# Patient Record
Sex: MALE | Race: WHITE | NOT HISPANIC OR LATINO | Employment: UNEMPLOYED
[De-identification: names, ages, dates, MRNs, and addresses within clinical notes are randomized per-mention and may not be internally consistent; named-entity substitution may affect disease eponyms.]

---

## 2018-06-13 ENCOUNTER — SURGERY (OUTPATIENT)
Age: 28
End: 2018-06-13

## 2018-06-13 ENCOUNTER — HOSPITAL ENCOUNTER (OUTPATIENT)
Facility: HOSPITAL | Age: 28
Discharge: HOME | End: 2018-06-14
Attending: EMERGENCY MEDICINE | Admitting: SURGERY

## 2018-06-13 ENCOUNTER — ANESTHESIA (OUTPATIENT)
Dept: SURGERY | Facility: HOSPITAL | Age: 28
End: 2018-06-13

## 2018-06-13 ENCOUNTER — ANESTHESIA EVENT (OUTPATIENT)
Dept: SURGERY | Facility: HOSPITAL | Age: 28
End: 2018-06-13

## 2018-06-13 DIAGNOSIS — T18.5XXA FOREIGN BODY IN ANUS AND RECTUM, INITIAL ENCOUNTER: ICD-10-CM

## 2018-06-13 DIAGNOSIS — T18.5XXA RECTAL FOREIGN BODY, INITIAL ENCOUNTER: ICD-10-CM

## 2018-06-13 DIAGNOSIS — T18.5XXA FOREIGN BODY ANUS/RECTUM, INITIAL ENCOUNTER: Primary | ICD-10-CM

## 2018-06-13 LAB
ALBUMIN SERPL BCP-MCNC: 4.8 G/DL
ALP SERPL-CCNC: 59 U/L
ALT SERPL W/O P-5'-P-CCNC: 13 U/L
ANION GAP SERPL CALC-SCNC: 13 MMOL/L
AST SERPL-CCNC: 26 U/L
BASOPHILS # BLD AUTO: 0.02 K/UL
BASOPHILS NFR BLD: 0.2 %
BILIRUB SERPL-MCNC: 0.6 MG/DL
BUN SERPL-MCNC: 9 MG/DL
CALCIUM SERPL-MCNC: 9.6 MG/DL
CHLORIDE SERPL-SCNC: 103 MMOL/L
CO2 SERPL-SCNC: 24 MMOL/L
CREAT SERPL-MCNC: 1.1 MG/DL
DIFFERENTIAL METHOD: ABNORMAL
EOSINOPHIL # BLD AUTO: 0 K/UL
EOSINOPHIL NFR BLD: 0 %
ERYTHROCYTE [DISTWIDTH] IN BLOOD BY AUTOMATED COUNT: 12.1 %
EST. GFR  (AFRICAN AMERICAN): >60 ML/MIN/1.73 M^2
EST. GFR  (NON AFRICAN AMERICAN): >60 ML/MIN/1.73 M^2
GLUCOSE SERPL-MCNC: 107 MG/DL
HCT VFR BLD AUTO: 43.7 %
HGB BLD-MCNC: 15.4 G/DL
LYMPHOCYTES # BLD AUTO: 1.4 K/UL
LYMPHOCYTES NFR BLD: 13.3 %
MCH RBC QN AUTO: 32 PG
MCHC RBC AUTO-ENTMCNC: 35.2 G/DL
MCV RBC AUTO: 91 FL
MONOCYTES # BLD AUTO: 0.6 K/UL
MONOCYTES NFR BLD: 5.4 %
NEUTROPHILS # BLD AUTO: 8.3 K/UL
NEUTROPHILS NFR BLD: 81.1 %
PLATELET # BLD AUTO: 203 K/UL
PMV BLD AUTO: 11.7 FL
POTASSIUM SERPL-SCNC: 4.5 MMOL/L
PROT SERPL-MCNC: 7.9 G/DL
RBC # BLD AUTO: 4.82 M/UL
SODIUM SERPL-SCNC: 140 MMOL/L
WBC # BLD AUTO: 10.29 K/UL

## 2018-06-13 PROCEDURE — 36000704 HC OR TIME LEV I 1ST 15 MIN: Performed by: SURGERY

## 2018-06-13 PROCEDURE — 63600175 PHARM REV CODE 636 W HCPCS: Performed by: NURSE ANESTHETIST, CERTIFIED REGISTERED

## 2018-06-13 PROCEDURE — 36415 COLL VENOUS BLD VENIPUNCTURE: CPT

## 2018-06-13 PROCEDURE — 25000003 PHARM REV CODE 250: Performed by: NURSE ANESTHETIST, CERTIFIED REGISTERED

## 2018-06-13 PROCEDURE — 99285 EMERGENCY DEPT VISIT HI MDM: CPT

## 2018-06-13 PROCEDURE — 80053 COMPREHEN METABOLIC PANEL: CPT

## 2018-06-13 PROCEDURE — 85025 COMPLETE CBC W/AUTO DIFF WBC: CPT

## 2018-06-13 PROCEDURE — 99284 EMERGENCY DEPT VISIT MOD MDM: CPT | Mod: 57,,, | Performed by: SURGERY

## 2018-06-13 PROCEDURE — 63600175 PHARM REV CODE 636 W HCPCS: Performed by: ANESTHESIOLOGY

## 2018-06-13 PROCEDURE — 37000009 HC ANESTHESIA EA ADD 15 MINS: Performed by: SURGERY

## 2018-06-13 PROCEDURE — 46608 ANOSCOPY REMOVE FOR BODY: CPT | Mod: ,,, | Performed by: SURGERY

## 2018-06-13 PROCEDURE — 36000705 HC OR TIME LEV I EA ADD 15 MIN: Performed by: SURGERY

## 2018-06-13 PROCEDURE — 71000015 HC POSTOP RECOV 1ST HR: Performed by: SURGERY

## 2018-06-13 PROCEDURE — 37000008 HC ANESTHESIA 1ST 15 MINUTES: Performed by: SURGERY

## 2018-06-13 PROCEDURE — 71000033 HC RECOVERY, INTIAL HOUR: Performed by: SURGERY

## 2018-06-13 RX ORDER — ACETAMINOPHEN 10 MG/ML
1000 INJECTION, SOLUTION INTRAVENOUS ONCE
Status: DISCONTINUED | OUTPATIENT
Start: 2018-06-14 | End: 2018-06-14 | Stop reason: HOSPADM

## 2018-06-13 RX ORDER — SODIUM CHLORIDE 0.9 % (FLUSH) 0.9 %
3 SYRINGE (ML) INJECTION EVERY 8 HOURS
Status: DISCONTINUED | OUTPATIENT
Start: 2018-06-14 | End: 2018-06-14 | Stop reason: HOSPADM

## 2018-06-13 RX ORDER — MEPERIDINE HYDROCHLORIDE 50 MG/ML
12.5 INJECTION INTRAMUSCULAR; INTRAVENOUS; SUBCUTANEOUS ONCE AS NEEDED
Status: DISCONTINUED | OUTPATIENT
Start: 2018-06-14 | End: 2018-06-14 | Stop reason: HOSPADM

## 2018-06-13 RX ORDER — SODIUM CHLORIDE 0.9 % (FLUSH) 0.9 %
3 SYRINGE (ML) INJECTION
Status: DISCONTINUED | OUTPATIENT
Start: 2018-06-14 | End: 2018-06-14 | Stop reason: HOSPADM

## 2018-06-13 RX ORDER — FENTANYL CITRATE 50 UG/ML
25 INJECTION, SOLUTION INTRAMUSCULAR; INTRAVENOUS EVERY 5 MIN PRN
Status: DISCONTINUED | OUTPATIENT
Start: 2018-06-14 | End: 2018-06-14 | Stop reason: HOSPADM

## 2018-06-13 RX ADMIN — PROPOFOL 170 MG: 10 INJECTION, EMULSION INTRAVENOUS at 11:06

## 2018-06-13 RX ADMIN — ROCURONIUM BROMIDE 5 MG: 10 INJECTION, SOLUTION INTRAVENOUS at 11:06

## 2018-06-13 RX ADMIN — SODIUM CHLORIDE, SODIUM LACTATE, POTASSIUM CHLORIDE, AND CALCIUM CHLORIDE: 600; 310; 30; 20 INJECTION, SOLUTION INTRAVENOUS at 11:06

## 2018-06-13 RX ADMIN — ONDANSETRON 4 MG: 2 INJECTION, SOLUTION INTRAMUSCULAR; INTRAVENOUS at 11:06

## 2018-06-13 RX ADMIN — SUCCINYLCHOLINE CHLORIDE 120 MG: 20 INJECTION, SOLUTION INTRAMUSCULAR; INTRAVENOUS at 11:06

## 2018-06-13 RX ADMIN — FENTANYL CITRATE 100 MCG: 50 INJECTION, SOLUTION INTRAMUSCULAR; INTRAVENOUS at 11:06

## 2018-06-13 RX ADMIN — LIDOCAINE HYDROCHLORIDE 60 MG: 20 INJECTION, SOLUTION EPIDURAL; INFILTRATION; INTRACAUDAL; PERINEURAL at 11:06

## 2018-06-14 VITALS
OXYGEN SATURATION: 99 % | HEIGHT: 67 IN | RESPIRATION RATE: 15 BRPM | HEART RATE: 79 BPM | SYSTOLIC BLOOD PRESSURE: 126 MMHG | BODY MASS INDEX: 23.74 KG/M2 | DIASTOLIC BLOOD PRESSURE: 60 MMHG | TEMPERATURE: 98 F | WEIGHT: 151.25 LBS

## 2018-06-14 PROCEDURE — 88300 SURGICAL PATH GROSS: CPT | Performed by: PATHOLOGY

## 2018-06-14 PROCEDURE — 88300 SURGICAL PATH GROSS: CPT | Mod: 26,,, | Performed by: PATHOLOGY

## 2018-06-14 RX ORDER — MEPERIDINE HYDROCHLORIDE 50 MG/ML
25 INJECTION INTRAMUSCULAR; INTRAVENOUS; SUBCUTANEOUS EVERY 4 HOURS PRN
Status: DISCONTINUED | OUTPATIENT
Start: 2018-06-14 | End: 2018-06-14 | Stop reason: HOSPADM

## 2018-06-14 RX ORDER — SODIUM CHLORIDE, SODIUM LACTATE, POTASSIUM CHLORIDE, CALCIUM CHLORIDE 600; 310; 30; 20 MG/100ML; MG/100ML; MG/100ML; MG/100ML
INJECTION, SOLUTION INTRAVENOUS CONTINUOUS PRN
Status: DISCONTINUED | OUTPATIENT
Start: 2018-06-13 | End: 2018-06-14

## 2018-06-14 RX ORDER — ONDANSETRON 8 MG/1
8 TABLET, ORALLY DISINTEGRATING ORAL EVERY 8 HOURS PRN
Status: DISCONTINUED | OUTPATIENT
Start: 2018-06-14 | End: 2018-06-14 | Stop reason: HOSPADM

## 2018-06-14 RX ORDER — ROCURONIUM BROMIDE 10 MG/ML
INJECTION, SOLUTION INTRAVENOUS
Status: DISCONTINUED | OUTPATIENT
Start: 2018-06-13 | End: 2018-06-14

## 2018-06-14 RX ORDER — ONDANSETRON 2 MG/ML
INJECTION INTRAMUSCULAR; INTRAVENOUS
Status: DISCONTINUED | OUTPATIENT
Start: 2018-06-13 | End: 2018-06-14

## 2018-06-14 RX ORDER — PROPOFOL 10 MG/ML
VIAL (ML) INTRAVENOUS
Status: DISCONTINUED | OUTPATIENT
Start: 2018-06-13 | End: 2018-06-14

## 2018-06-14 RX ORDER — SUCCINYLCHOLINE CHLORIDE 20 MG/ML
INJECTION INTRAMUSCULAR; INTRAVENOUS
Status: DISCONTINUED | OUTPATIENT
Start: 2018-06-13 | End: 2018-06-14

## 2018-06-14 RX ORDER — LIDOCAINE HYDROCHLORIDE 20 MG/ML
INJECTION, SOLUTION EPIDURAL; INFILTRATION; INTRACAUDAL; PERINEURAL
Status: DISCONTINUED | OUTPATIENT
Start: 2018-06-13 | End: 2018-06-14

## 2018-06-14 NOTE — TRANSFER OF CARE
"Anesthesia Transfer of Care Note    Patient: Alex Gerardo    Procedure(s) Performed: Procedure(s) (LRB):  REMOVAL, FOREIGN BODY (N/A)    Patient location: PACU    Anesthesia Type: general    Transport from OR: Transported from OR on room air with adequate spontaneous ventilation    Post pain: adequate analgesia    Post assessment: no apparent anesthetic complications and tolerated procedure well    Post vital signs: stable    Level of consciousness: awake    Nausea/Vomiting: no nausea/vomiting    Complications: none    Transfer of care protocol was followed      Last vitals:   Visit Vitals  /60   Pulse 79   Temp 36.8 °C (98.2 °F) (Oral)   Resp 15   Ht 5' 7" (1.702 m)   Wt 68.6 kg (151 lb 3.8 oz)   SpO2 99%   BMI 23.69 kg/m²     "

## 2018-06-14 NOTE — ANESTHESIA PREPROCEDURE EVALUATION
06/13/2018  Alex Gerardo is a 27 y.o., male.    Anesthesia Evaluation    I have reviewed the Patient Summary Reports.    I have reviewed the Nursing Notes.      Review of Systems  Anesthesia Hx:  No problems with previous Anesthesia  Denies Family Hx of Anesthesia complications.   Denies Personal Hx of Anesthesia complications.   Hematology/Oncology:  Hematology Normal   Oncology Normal     EENT/Dental:EENT/Dental Normal   Cardiovascular:  Cardiovascular Normal     Pulmonary:  Pulmonary Normal    Renal/:  Renal/ Normal     Hepatic/GI:   Foreign body anus/rectum.   Musculoskeletal:  Musculoskeletal Normal    Neurological:   Hx of sowing needle being stuck in his back causing meningitis, sepsis and pneumonia with prolonged hospital of 3 months in 2004.    Endocrine:  Endocrine Normal    Dermatological:  Skin Normal    Psych:  Psychiatric Normal           Physical Exam  General:  Well nourished    Airway/Jaw/Neck:  Airway Findings: Mouth Opening: Normal Tongue: Normal  General Airway Assessment: Adult  Mallampati: I  TM Distance: Normal, at least 6 cm      Dental:  Dental Findings: In tact   Chest/Lungs:  Chest/Lungs Findings: Normal Respiratory Rate         Mental Status:  Mental Status Findings:  Cooperative, Alert and Oriented         Anesthesia Plan  Type of Anesthesia, risks & benefits discussed:  Anesthesia Type:  general  Patient's Preference:   Intra-op Monitoring Plan: standard ASA monitors  Intra-op Monitoring Plan Comments:   Post Op Pain Control Plan: IV/PO Opioids PRN  Post Op Pain Control Plan Comments:   Induction:   IV  Beta Blocker:  Patient is not currently on a Beta-Blocker (No further documentation required).       Informed Consent: Patient understands risks and agrees with Anesthesia plan.  Questions answered. Anesthesia consent signed with patient.  ASA Score: 1     Day of Surgery  Review of History & Physical: I have interviewed and examined the patient. I have reviewed the patient's H&P dated:            Ready For Surgery From Anesthesia Perspective.

## 2018-06-14 NOTE — ED PROVIDER NOTES
SCRIBE #1 NOTE: I, Анна Smith, am scribing for, and in the presence of, Jaxon Palomo Do, MD. I have scribed the entire note.      History      Chief Complaint   Patient presents with    Foreign Body in Rectum     with bleeding for last 3 hours       Review of patient's allergies indicates:  No Known Allergies     HPI   HPI    6/13/2018, 10:15 PM   History obtained from the patient      History of Present Illness: Alex Gerardo is a 27 y.o. male patient who presents to the Emergency Department for foreign body stuck in rectum which onset suddenly today. Pt reports he was using a dildo and it got stuck.  Symptoms are constant and moderate in severity. No mitigating or exacerbating factors reported. No associated sxs reported. Patient denies any fever, chills, abd pain, n/v/d, anal bleeding, extremity numbness/weakness and all other sxs at this time. No prior Tx reported. No further complaints or concerns at this time.         Arrival mode: Personal vehicle     PCP: Primary Doctor No       Past Medical History:  Past medical history reviewed not relevant      Past Surgical History:  Past surgical history reviewed not relevant      Family History:  Family history reviewed not relevant      Social History:  Social History    Social History Main Topics    Social History Main Topics    Smoking status: Unknown if ever smoked    Smokeless tobacco: Unknown if ever used    Alcohol Use: Unknown drinking history    Drug Use: Unknown if ever used    Sexual Activity: Unknown       ROS   Review of Systems   Constitutional: Negative for chills, diaphoresis, fatigue and fever.   HENT: Negative for congestion and sinus pain.    Respiratory: Negative for cough and shortness of breath.    Cardiovascular: Negative for chest pain and palpitations.   Gastrointestinal: Positive for rectal pain (foreign body stuck in rectum). Negative for abdominal pain, anal bleeding, constipation, diarrhea, nausea and vomiting.   Genitourinary:  "Negative for difficulty urinating, frequency, hematuria and urgency.   Musculoskeletal: Negative for arthralgias, back pain, myalgias, neck pain and neck stiffness.   Neurological: Negative for dizziness, weakness, light-headedness, numbness and headaches.       Physical Exam      Initial Vitals [06/13/18 2102]   BP Pulse Resp Temp SpO2   (!) 172/81 106 16 98.9 °F (37.2 °C) 97 %      MAP       --          Physical Exam  Nursing Notes and Vital Signs Reviewed.  Constitutional: Patient is in no acute distress. Well-developed and well-nourished.  Head: Atraumatic. Normocephalic.  Eyes: PERRL. EOM intact. Conjunctivae are not pale. No scleral icterus.  ENT: Mucous membranes are moist. Oropharynx is clear and symmetric.    Neck: Supple. Full ROM. No lymphadenopathy.  Cardiovascular: Regular rate. Regular rhythm. No murmurs, rubs, or gallops. Distal pulses are 2+ and symmetric.  Pulmonary/Chest: No respiratory distress. Clear to auscultation bilaterally. No wheezing or rales.  Abdominal: Soft and non-distended.  There is no tenderness.  No rebound, guarding, or rigidity. Good bowel sounds.  Rectal: No tenderness.  No masses.  No hemorrhoids.  Normal sphincter tone.  Stool color is normal. Plastic dildo tip palpable. No rectal bleeding.  Musculoskeletal: Moves all extremities. No obvious deformities. No edema. No calf tenderness.  Skin: Warm and dry.  Neurological:  Alert, awake, and appropriate.  Normal speech.  No acute focal neurological deficits are appreciated.  Psychiatric: Normal affect. Good eye contact. Appropriate in content.    ED Course    Procedures  ED Vital Signs:  Vitals:    06/13/18 2102 06/14/18 0009 06/14/18 0010 06/14/18 0015   BP: (!) 172/81 113/60 113/60 130/73   Pulse: 106 91 94 81   Resp: 16 10 11 11   Temp: 98.9 °F (37.2 °C) 98.3 °F (36.8 °C)     TempSrc: Oral Oral     SpO2: 97% 100% 100% 99%   Weight: 68.6 kg (151 lb 3.8 oz)      Height: 5' 7" (1.702 m)          Abnormal Lab Results:  Labs " Reviewed   CBC W/ AUTO DIFFERENTIAL - Abnormal; Notable for the following:        Result Value    MCH 32.0 (*)     Gran # (ANC) 8.3 (*)     Gran% 81.1 (*)     Lymph% 13.3 (*)     All other components within normal limits   COMPREHENSIVE METABOLIC PANEL        All Lab Results:  Results for orders placed or performed during the hospital encounter of 06/13/18   CBC W/ AUTO DIFFERENTIAL   Result Value Ref Range    WBC 10.29 3.90 - 12.70 K/uL    RBC 4.82 4.60 - 6.20 M/uL    Hemoglobin 15.4 14.0 - 18.0 g/dL    Hematocrit 43.7 40.0 - 54.0 %    MCV 91 82 - 98 fL    MCH 32.0 (H) 27.0 - 31.0 pg    MCHC 35.2 32.0 - 36.0 g/dL    RDW 12.1 11.5 - 14.5 %    Platelets 203 150 - 350 K/uL    MPV 11.7 9.2 - 12.9 fL    Gran # (ANC) 8.3 (H) 1.8 - 7.7 K/uL    Lymph # 1.4 1.0 - 4.8 K/uL    Mono # 0.6 0.3 - 1.0 K/uL    Eos # 0.0 0.0 - 0.5 K/uL    Baso # 0.02 0.00 - 0.20 K/uL    Gran% 81.1 (H) 38.0 - 73.0 %    Lymph% 13.3 (L) 18.0 - 48.0 %    Mono% 5.4 4.0 - 15.0 %    Eosinophil% 0.0 0.0 - 8.0 %    Basophil% 0.2 0.0 - 1.9 %    Differential Method Automated    Comp. Metabolic Panel   Result Value Ref Range    Sodium 140 136 - 145 mmol/L    Potassium 4.5 3.5 - 5.1 mmol/L    Chloride 103 95 - 110 mmol/L    CO2 24 23 - 29 mmol/L    Glucose 107 70 - 110 mg/dL    BUN, Bld 9 6 - 20 mg/dL    Creatinine 1.1 0.5 - 1.4 mg/dL    Calcium 9.6 8.7 - 10.5 mg/dL    Total Protein 7.9 6.0 - 8.4 g/dL    Albumin 4.8 3.5 - 5.2 g/dL    Total Bilirubin 0.6 0.1 - 1.0 mg/dL    Alkaline Phosphatase 59 55 - 135 U/L    AST 26 10 - 40 U/L    ALT 13 10 - 44 U/L    Anion Gap 13 8 - 16 mmol/L    eGFR if African American >60 >60 mL/min/1.73 m^2    eGFR if non African American >60 >60 mL/min/1.73 m^2         Imaging Results:  Imaging Results    None                 The Emergency Provider reviewed the vital signs and test results, which are outlined above.    ED Discussion     10:22 PM: Discussed case with . Dr. Fisher agrees with current care and management of pt and  accepts admission.   Admitting Physician: Dr. Fisher  Admit to: General Surgery           ED Medication(s):  Medications   sodium chloride 0.9% flush 3 mL (not administered)   sodium chloride 0.9% flush 3 mL (not administered)   acetaminophen (10 mg/mL) injection 1,000 mg (not administered)   meperidine injection 12.5 mg (not administered)   fentaNYL injection 25 mcg (100 mcg Intravenous Given 6/13/18 8072)   ondansetron disintegrating tablet 8 mg (not administered)   meperidine injection 25 mg (not administered)       There are no discharge medications for this patient.      Follow-up Information     Hector Fisher MD.    Specialty:  General Surgery  Why:  As needed  Contact information:  [address]  [facility] [address] [phone]                     Medical Decision Making    Medical Decision Making:   Clinical Tests:   Lab Tests: Reviewed and Ordered           Scribe Attestation:   Scribe #1: I performed the above scribed service and the documentation accurately describes the services I performed. I attest to the accuracy of the note.    Attending:   Physician Attestation Statement for Scribe #1: I, Jaxon Palomo Do, MD, personally performed the services described in this documentation, as scribed by Анна Smith, in my presence, and it is both accurate and complete.          Clinical Impression       ICD-10-CM ICD-9-CM   1. Foreign body anus/rectum, initial encounter T18.5XXA 937     E915   2. Foreign body in anus and rectum, initial encounter T18.5XXA 937     E915   3. Rectal foreign body, initial encounter T18.5XXA 937     E915       Disposition:   Disposition: Admitted  Condition: Fair         Jaxon Palomo Do, MD  06/14/18 0020

## 2018-06-14 NOTE — OP NOTE
Operative Note       Surgery Date: 6/13/2018     Surgeon(s) and Role:     * Hector Fisher MD - Primary    Pre-op Diagnosis:  Foreign body anus/rectum [T18.5XXA]    Post-op Diagnosis: Post-Op Diagnosis Codes:     * Foreign body anus/rectum [T18.5XXA]    Procedure(s) (LRB):  REMOVAL, FOREIGN BODY (N/A)    Anesthesia: Choice    Procedure in Detail/Findings:    Patient was brought into the operative room placed on the operating table in the supine position.  Bag mask anesthesia was provided by the anesthesia service.  He was then placed in the modified lithotomy position.    A time-out was performed.    A finger was inserted in the foreign body was easily palpated.  The anoscope was inserted in the foreign body was be on the 1st rectal fold.  It could be palpated.  It could be seen with gentle inward pressure from the anoscope.    A ring forcep was used to grab the foreign body and remove it through the anus.  This procedure was facilitated by gentle pressure on his abdomen.    Once the foreign body was removed the anoscope was inserted there was no caro evidence of a rectal tear.    The patient tolerated the procedure well was returned to the recovery room in stable condition      Estimated Blood Loss: * No values recorded between 6/13/2018 12:00 AM and 6/14/2018 12:07 AM *           Specimens     None        Implants: * No implants in log *           Disposition: PACU - hemodynamically stable.           Condition: Stable    Attestation:  I performed the procedure.           Discharge Note    Admit Date: 6/13/2018    Attending Physician: No att. providers found     Discharge Physician: No att. providers found    Final Diagnosis: Post-Op Diagnosis Codes:     * Foreign body anus/rectum [T18.5XXA]    Disposition: Home or Self Care    Patient Instructions:   There are no discharge medications for this patient.      Discharge Procedure Orders (must include Diet, Follow-up, Activity)    Discharge Procedure Orders (must  include Diet, Follow-up, Activity)  Diet general     Call MD for:  temperature >100.4     Call MD for:  persistent nausea and vomiting     Call MD for:  severe uncontrolled pain     Call MD for:  difficulty breathing, headache or visual disturbances     Call MD for:  redness, tenderness, or signs of infection (pain, swelling, redness, odor or green/yellow discharge around incision site)      follow up with Dr. Denny as needed.    Discharge Date: No discharge date for patient encounter.

## 2018-06-14 NOTE — DISCHARGE INSTRUCTIONS
You may pass a little bloody stool with the 1st bowel movement or 2.  If you have significant bleeding please let us know or return to the emergency room    If you have abdominal pain, fever chills please return to the emergency room.        Please Re pain from putting objects through your anus into the rectum unless there is specifically designed for that purpose    General Information:    1. Do not drink alcoholic beverages including beer for 24 hours or as long as you are on pain medication..  2. Do not drive a motor vehicle, operate machinery or power tools, or signs legal papers for 24 hours or as long as you are on pain medication.   3. You may experience light-headedness, dizziness, and sleepiness following surgery. Please do not stay alone. A responsible adult should be with you for this 24 hour period.  4. Go home and rest.  5. Progress slowly to a normal diet unless instructed.  Otherwise, begin with liquids such as soft drinks, then soup and crackers working up to solid foods. Drink plenty of nonalcoholic fluids.  6. Certain anesthetics and pain medications produce nausea and vomiting in certain individuals. If nausea becomes a problem at home, call you doctor.  7. A nurse will be calling you sometime after surgery. Do not be alarmed. This is our way of finding out how you are doing.  8. Several times every hour while you are awake, take 2-3 deep breaths and cough. If you had stomach surgery hold a pillow or rolled towel firmly against your stomach before you cough. This will help with any pain the cough might cause.  9. Several times every hour while you are awake, pump and flex your feet 5-6 times and do foot circles. This will help prevent blood clots.  10. Call your doctor for severe pain, bleeding, fever, or signs or symptoms of infection (pain, swelling, redness, foul odor, drainage).  11. You can contact your doctor anytime by [phone] for the [facility] (at [facility]) or  [phone] for the [facility] on [facility].   [email or web address] is another way to contact your doctor if you are an active participant online with [facility].  12. Continue Nozin provided at discharge twice daily for 7 days or until the incision is healed.  See pamphlet or box for instructions.

## 2018-06-14 NOTE — PLAN OF CARE
Discharge instructions discussed with patient. Family presence refused. Verbalized understanding.

## 2018-06-14 NOTE — ANESTHESIA POSTPROCEDURE EVALUATION
"Anesthesia Post Evaluation    Patient: Alex Gerardo    Procedure(s) Performed: Procedure(s) (LRB):  REMOVAL, FOREIGN BODY (N/A)    Final Anesthesia Type: general  Patient location during evaluation: PACU  Patient participation: Yes- Able to Participate  Level of consciousness: awake and alert  Post-procedure vital signs: reviewed and stable  Pain management: adequate  Airway patency: patent  PONV status at discharge: No PONV  Anesthetic complications: no      Cardiovascular status: blood pressure returned to baseline  Respiratory status: unassisted  Hydration status: euvolemic  Follow-up not needed.        Visit Vitals  /60   Pulse 79   Temp 36.8 °C (98.2 °F) (Oral)   Resp 15   Ht 5' 7" (1.702 m)   Wt 68.6 kg (151 lb 3.8 oz)   SpO2 99%   BMI 23.69 kg/m²       Pain/Kel Score: Presence of Pain: denies (6/14/2018 12:15 AM)  Kel Score: 10 (6/14/2018 12:15 AM)      "

## 2018-06-14 NOTE — H&P
[facility]  General Surgery  History & Physical    Patient Name: Alex Gerardo  MRN: [id number]  Admission Date: 6/13/2018  Attending Physician:  Gino Headley MD  Primary Care Provider: Primary Doctor No    Patient information was obtained from patient, relative(s) and ER records.     Subjective:     Chief Complaint/Reason for Admission: foreign body in rectum/ exam under anesthesia, removal of the foreign body, discharge    History of Present Illness: Patient was using a sex toy rectally and it become stuck in his rectum    No current facility-administered medications on file prior to encounter.      No current outpatient prescriptions on file prior to encounter.       Review of patient's allergies indicates:  No Known Allergies    No past medical history on file.  No past surgical history on file.  Family History     None        Social History Main Topics    Smoking status: Not on file    Smokeless tobacco: Not on file    Alcohol use Not on file    Drug use: Unknown    Sexual activity: Not on file     Review of Systems   Constitutional: Negative.    HENT: Negative.    Eyes: Negative.    Respiratory: Negative.    Cardiovascular: Negative.    Gastrointestinal: Negative.         Mild bloating feeling   Endocrine: Negative.    Genitourinary: Negative.    Musculoskeletal: Negative.    Skin: Negative.    Allergic/Immunologic: Negative.    Neurological: Negative.    Hematological: Negative.    Psychiatric/Behavioral: Negative.      Objective:     Vital Signs (Most Recent):  Temp: 98.9 °F (37.2 °C) (06/13/18 2102)  Pulse: 106 (06/13/18 2102)  Resp: 16 (06/13/18 2102)  BP: (!) 172/81 (06/13/18 2102)  SpO2: 97 % (06/13/18 2102) Vital Signs (24h Range):  Temp:  [98.9 °F (37.2 °C)] 98.9 °F (37.2 °C)  Pulse:  [106] 106  Resp:  [16] 16  SpO2:  [97 %] 97 %  BP: (172)/(81) 172/81     Weight: 68.6 kg (151 lb 3.8 oz)  Body mass index is 23.69 kg/m².    Physical Exam   Constitutional: He is oriented to person,  place, and time. He appears well-developed and well-nourished. No distress.   HENT:   Head: Normocephalic and atraumatic.   Eyes: No scleral icterus.   Neck: Normal range of motion. Neck supple. No JVD present. No tracheal deviation present. No thyromegaly present.   Cardiovascular: Normal rate, regular rhythm and normal heart sounds.    Pulmonary/Chest: Effort normal and breath sounds normal.   Abdominal: Soft. Bowel sounds are normal. He exhibits no distension and no mass. There is no tenderness. There is no rebound and no guarding.   Genitourinary:   Genitourinary Comments: Per ED MD a rubber foreign body in rectum   Musculoskeletal: Normal range of motion.   Lymphadenopathy:     He has no cervical adenopathy.   Neurological: He is alert and oriented to person, place, and time.   Skin: Skin is warm and dry.   Psychiatric: He has a normal mood and affect. His behavior is normal. Judgment and thought content normal.       Significant Labs:  none    Significant Diagnostics:  none    Assessment/Plan:     No notes have been filed under this hospital service.  Service: General Surgery    VTE Risk Mitigation     None          Hector Fisher MD  General Surgery  [facility]

## 2018-06-14 NOTE — SUBJECTIVE & OBJECTIVE
No current facility-administered medications on file prior to encounter.      No current outpatient prescriptions on file prior to encounter.       Review of patient's allergies indicates:  No Known Allergies    No past medical history on file.  No past surgical history on file.  Family History     None        Social History Main Topics    Smoking status: Not on file    Smokeless tobacco: Not on file    Alcohol use Not on file    Drug use: Unknown    Sexual activity: Not on file     Review of Systems   Constitutional: Negative.    HENT: Negative.    Eyes: Negative.    Respiratory: Negative.    Cardiovascular: Negative.    Gastrointestinal: Negative.         Mild bloating feeling   Endocrine: Negative.    Genitourinary: Negative.    Musculoskeletal: Negative.    Skin: Negative.    Allergic/Immunologic: Negative.    Neurological: Negative.    Hematological: Negative.    Psychiatric/Behavioral: Negative.      Objective:     Vital Signs (Most Recent):  Temp: 98.9 °F (37.2 °C) (06/13/18 2102)  Pulse: 106 (06/13/18 2102)  Resp: 16 (06/13/18 2102)  BP: (!) 172/81 (06/13/18 2102)  SpO2: 97 % (06/13/18 2102) Vital Signs (24h Range):  Temp:  [98.9 °F (37.2 °C)] 98.9 °F (37.2 °C)  Pulse:  [106] 106  Resp:  [16] 16  SpO2:  [97 %] 97 %  BP: (172)/(81) 172/81     Weight: 68.6 kg (151 lb 3.8 oz)  Body mass index is 23.69 kg/m².    Physical Exam   Constitutional: He is oriented to person, place, and time. He appears well-developed and well-nourished. No distress.   HENT:   Head: Normocephalic and atraumatic.   Eyes: No scleral icterus.   Neck: Normal range of motion. Neck supple. No JVD present. No tracheal deviation present. No thyromegaly present.   Cardiovascular: Normal rate, regular rhythm and normal heart sounds.    Pulmonary/Chest: Effort normal and breath sounds normal.   Abdominal: Soft. Bowel sounds are normal. He exhibits no distension and no mass. There is no tenderness. There is no rebound and no guarding.    Genitourinary:   Genitourinary Comments: Per ED MD a rubber foreign body in rectum   Musculoskeletal: Normal range of motion.   Lymphadenopathy:     He has no cervical adenopathy.   Neurological: He is alert and oriented to person, place, and time.   Skin: Skin is warm and dry.   Psychiatric: He has a normal mood and affect. His behavior is normal. Judgment and thought content normal.       Significant Labs:  none    Significant Diagnostics:  none

## (undated) DEVICE — SEE MEDLINE ITEM 157117

## (undated) DEVICE — NDL SAFETY 22G X 1.5 ECLIPSE

## (undated) DEVICE — INSERT CUSHIONPRONE VIEW LARGE

## (undated) DEVICE — SEE MEDLINE ITEM 152739

## (undated) DEVICE — APPLICATOR CHLORAPREP ORN 26ML

## (undated) DEVICE — SUT PROLENE 2-0 SH 36IN BLU

## (undated) DEVICE — ELECTRODE REM PLYHSV RETURN 9

## (undated) DEVICE — TAPE SILK 3IN

## (undated) DEVICE — SEE MEDLINE ITEM 157148

## (undated) DEVICE — SUT VICRYL PLUS 3-0 PS2 18

## (undated) DEVICE — GLOVE PROTEXIS HYDROGEL SZ7.5

## (undated) DEVICE — ADHESIVE MASTISOL VIAL 48/BX

## (undated) DEVICE — MANIFOLD 4 PORT

## (undated) DEVICE — SEE MEDLINE ITEM 146420

## (undated) DEVICE — PANTIES FEMININE NAPKIN LG/XLG

## (undated) DEVICE — COVER OVERHEAD SURG LT BLUE

## (undated) DEVICE — SEE MEDLINE ITEM 157027

## (undated) DEVICE — SYR 10CC LUER LOCK

## (undated) DEVICE — SUT VICRYL 0 SH

## (undated) DEVICE — SUT MONOCYRL 4-0 PS2 UND

## (undated) DEVICE — SEE L#152161

## (undated) DEVICE — PAD ABD 8X10 STERILE

## (undated) DEVICE — GAUZE SPONGE 4X4 12PLY

## (undated) DEVICE — SUT VICRYL 3-0 27 SH

## (undated) DEVICE — SOL NS 1000CC

## (undated) DEVICE — SEE MEDLINE ITEM 152622

## (undated) DEVICE — GAUZE PETROLATUM VASLNE 3X36IN

## (undated) DEVICE — SUT CTD VICRYL 2-0 UND BR

## (undated) DEVICE — GAUZE VASELINE PETRO 3X9

## (undated) DEVICE — CONTAINER SPECIMEN STRL 4OZ